# Patient Record
Sex: MALE | Race: ASIAN | NOT HISPANIC OR LATINO
[De-identification: names, ages, dates, MRNs, and addresses within clinical notes are randomized per-mention and may not be internally consistent; named-entity substitution may affect disease eponyms.]

---

## 2020-09-29 PROBLEM — Z00.00 ENCOUNTER FOR PREVENTIVE HEALTH EXAMINATION: Status: ACTIVE | Noted: 2020-09-29

## 2020-10-01 ENCOUNTER — APPOINTMENT (OUTPATIENT)
Dept: PHYSICAL MEDICINE AND REHAB | Facility: CLINIC | Age: 30
End: 2020-10-01
Payer: COMMERCIAL

## 2020-10-01 ENCOUNTER — APPOINTMENT (OUTPATIENT)
Dept: RADIOLOGY | Facility: CLINIC | Age: 30
End: 2020-10-01
Payer: COMMERCIAL

## 2020-10-01 ENCOUNTER — OUTPATIENT (OUTPATIENT)
Dept: OUTPATIENT SERVICES | Facility: HOSPITAL | Age: 30
LOS: 1 days | End: 2020-10-01
Payer: COMMERCIAL

## 2020-10-01 ENCOUNTER — RESULT REVIEW (OUTPATIENT)
Age: 30
End: 2020-10-01

## 2020-10-01 VITALS
HEIGHT: 75 IN | DIASTOLIC BLOOD PRESSURE: 97 MMHG | WEIGHT: 235 LBS | HEART RATE: 84 BPM | BODY MASS INDEX: 29.22 KG/M2 | SYSTOLIC BLOOD PRESSURE: 147 MMHG

## 2020-10-01 DIAGNOSIS — Z78.9 OTHER SPECIFIED HEALTH STATUS: ICD-10-CM

## 2020-10-01 DIAGNOSIS — S20.219A CONTUSION OF UNSPECIFIED FRONT WALL OF THORAX, INITIAL ENCOUNTER: ICD-10-CM

## 2020-10-01 DIAGNOSIS — S63.280A: ICD-10-CM

## 2020-10-01 DIAGNOSIS — R07.81 PLEURODYNIA: ICD-10-CM

## 2020-10-01 DIAGNOSIS — Z72.89 OTHER PROBLEMS RELATED TO LIFESTYLE: ICD-10-CM

## 2020-10-01 DIAGNOSIS — M79.18 MYALGIA, OTHER SITE: ICD-10-CM

## 2020-10-01 DIAGNOSIS — M89.8X1 OTHER SPECIFIED DISORDERS OF BONE, SHOULDER: ICD-10-CM

## 2020-10-01 DIAGNOSIS — Z91.81 HISTORY OF FALLING: ICD-10-CM

## 2020-10-01 PROCEDURE — 99204 OFFICE O/P NEW MOD 45 MIN: CPT

## 2020-10-01 PROCEDURE — 73010 X-RAY EXAM OF SHOULDER BLADE: CPT | Mod: 26,LT

## 2020-10-01 PROCEDURE — 71100 X-RAY EXAM RIBS UNI 2 VIEWS: CPT

## 2020-10-01 PROCEDURE — 73010 X-RAY EXAM OF SHOULDER BLADE: CPT

## 2020-10-01 PROCEDURE — 71100 X-RAY EXAM RIBS UNI 2 VIEWS: CPT | Mod: 26,LT

## 2020-10-01 RX ORDER — ASCORBIC ACID 500 MG
TABLET ORAL
Refills: 0 | Status: ACTIVE | COMMUNITY

## 2020-10-01 RX ORDER — MULTIVITAMIN
TABLET ORAL
Refills: 0 | Status: ACTIVE | COMMUNITY

## 2020-10-01 NOTE — HISTORY OF PRESENT ILLNESS
[FreeTextEntry1] : Mr. CLAUDY CAIN is a 30 year old  male who presents with upper back. Patient fell down stairs on Saturday night, patient got up in middle of the night to go bathroom, for which he had a little to drink the same night, and lost his balance, falling down stairs. Denies any LOC. Did hit left side of head but hasn't noticed any pain. Denies any HA, blurry vision or dizziness.  \par \par Also complains of right ring finger pain from the fall, for which he would like to see a hand specialist\par \par Location:Pain is worse in mid scapula region on the left. \par Onset:5 days ago\par Provocation/Palliative: Twisting or bending torso provokes pain, bending R ring finger\par Quality:Can be sharp with movement\par Radiation:None\par Severity:No pain at rest, just when he moves\par Timing:Gradually getting better\par \par Denies any associated numbness. Denies any associated leg weakness. Denies any loss of bowel/bladder control or any groin numbness.\par Previous medications trialed:Nothing\par Previous procedures relevant to complaint:None\par Conservative therapy tried?:None\par

## 2020-10-01 NOTE — REVIEW OF SYSTEMS
[Negative] : Heme/Lymph [FreeTextEntry4] : denies HA, denies blurry vision, denies dizziness [FreeTextEntry9] : Upper back pain

## 2020-10-01 NOTE — PHYSICAL EXAM
[FreeTextEntry1] : PE:\par Constitutional: In NAD, calm and cooperative\par HEENT: NCAT, Anicteric sclera, no lid-lag\par Cardio: Extremities appear pink and well perfused\par Respiratory: Normal respiratory effort on room air, no accessory muscle use\par Skin: no rashes seen on exposed skin, no visible abrasions\par Psych: Normal affect, intact judgment and insight\par Neuro: Awake, alert and oriented x 3, see below for focused neurological exam\par MSK (back):\par 	Inspection: no gross swelling identified, no bruising appreciated over left sided ribs or left scapula\par 	Palpation: No TTP over left scapula. Mild TTP over left lateral ribs under armpit\par 	ROM: Minimal pain with lumbar flexion, no pain with extension\par 	Strength: 5/5 strength in bilateral lower extremities\par 	Reflexes: 2+ Patella reflex bilaterally, 2+ Achilles reflex bilaterally, negative clonus bilaterally\par 	Sensation: Intact to light touch in bilateral lower extremities\par \par R ring finger:\par Swelling around the PIP with pain with AROM. Some bruising appreciated around joint.

## 2020-10-01 NOTE — ASSESSMENT
[FreeTextEntry1] : Mr. Burton is a pleasant 30 y.o. M s/p fall down stairs over the weekend with left scapula pain and rib pain, likely due to myofascial pain and bruised ribs, as well as a likely s/p dislocated right ring finger at the PIP joint. Patient's pain has improved over the last few days but wanted to be checked out. Denies any red flag signs including HA, dizziness or blurry vision, but advised patient that if any occur signs due occur, to seek immediate help. Due to traumatic nature of pain, will order X-rays of left ribs and left scapula. Patient to ice, use NSAIDs prn. Patient also given referral to ortho hand surgery. Patient to follow up as needed. Patient in agreement with plan.

## 2020-10-06 ENCOUNTER — APPOINTMENT (OUTPATIENT)
Dept: ORTHOPEDIC SURGERY | Facility: CLINIC | Age: 30
End: 2020-10-06
Payer: COMMERCIAL

## 2020-10-06 VITALS
BODY MASS INDEX: 29.22 KG/M2 | SYSTOLIC BLOOD PRESSURE: 137 MMHG | WEIGHT: 235 LBS | HEIGHT: 75 IN | DIASTOLIC BLOOD PRESSURE: 88 MMHG | HEART RATE: 105 BPM | TEMPERATURE: 90.9 F

## 2020-10-06 DIAGNOSIS — S63.254A: ICD-10-CM

## 2020-10-06 PROCEDURE — 99204 OFFICE O/P NEW MOD 45 MIN: CPT | Mod: 57

## 2020-10-06 PROCEDURE — 73140 X-RAY EXAM OF FINGER(S): CPT | Mod: F8

## 2020-10-06 PROCEDURE — 26770 TREAT FINGER DISLOCATION: CPT | Mod: F8

## 2020-10-06 NOTE — ASSESSMENT
[FreeTextEntry1] : 30-year-old male one week status post injury to right ring finger PIP joint with a simple dislocation. The dislocation was reduced in the office today without complication. I recommend abdiaziz loop immobilization and hand therapy to work on range of motion. Follow up 2 weeks for x-rays and range of motion check.

## 2020-10-06 NOTE — PROCEDURE
[FreeTextEntry1] : Right ring finger PIP joint reduction:\par Digital block administered with 10 cc of 1% plain lidocaine\par Closed reduction performed, confirmed with fluoroscopic imaging. Passive range of motion of the digit postreductionslightly limited secondary to stiffness, patient is able to bring the finger to palm.

## 2020-10-06 NOTE — HISTORY OF PRESENT ILLNESS
[FreeTextEntry1] : 10/06/2020: The patient is a 30-year-old right-hand-dominant male who presents to the office with stiffness and range of motion loss at the right ring finger. He had a mechanical fall this past Saturday and notes that he may have had a hyperextension injury of the finger. He has significant swelling over the PIP joint that is associated with very minimal pain. He has not taken any medications to alleviate his symptoms. He denies paresthesias.

## 2020-10-06 NOTE — PHYSICAL EXAM
[Normal Finger/nose] : finger to nose coordination [Normal] : no peripheral adenopathy appreciated [de-identified] : Right ring finger exam\par \par Skin is intact with no erythema. There is some ecchymosis on the dorsum of the middle phalanx. There is significant swelling surrounding the PIP joint. There is mild tenderness to palpation at the PIP joint. The patient has a tip to palm distance of about 4-5 cm. Patient is able to flex and extend at the MCP and DIP joints. There is significant stiffness with flexion and extension at the PIP joint. Sensation is grossly intact and capillary refill is brisk.\par \par  [de-identified] : SALVADORR [de-identified] : 3 x-ray views of the right ring finger were obtained.

## 2020-10-07 ENCOUNTER — APPOINTMENT (OUTPATIENT)
Dept: ORTHOPEDIC SURGERY | Facility: CLINIC | Age: 30
End: 2020-10-07